# Patient Record
Sex: MALE | Race: WHITE | NOT HISPANIC OR LATINO | URBAN - METROPOLITAN AREA
[De-identification: names, ages, dates, MRNs, and addresses within clinical notes are randomized per-mention and may not be internally consistent; named-entity substitution may affect disease eponyms.]

---

## 2017-04-28 ENCOUNTER — EMERGENCY (EMERGENCY)
Facility: HOSPITAL | Age: 77
LOS: 0 days | Discharge: HOME | End: 2017-04-28
Attending: EMERGENCY MEDICINE | Admitting: EMERGENCY MEDICINE
Payer: MEDICARE

## 2017-04-28 VITALS
DIASTOLIC BLOOD PRESSURE: 67 MMHG | HEART RATE: 68 BPM | SYSTOLIC BLOOD PRESSURE: 123 MMHG | HEIGHT: 70 IN | TEMPERATURE: 98 F | OXYGEN SATURATION: 97 % | WEIGHT: 195.11 LBS | RESPIRATION RATE: 17 BRPM

## 2017-04-28 VITALS
DIASTOLIC BLOOD PRESSURE: 77 MMHG | HEART RATE: 74 BPM | RESPIRATION RATE: 16 BRPM | OXYGEN SATURATION: 100 % | TEMPERATURE: 98 F | SYSTOLIC BLOOD PRESSURE: 145 MMHG

## 2017-04-28 DIAGNOSIS — S09.90XA UNSPECIFIED INJURY OF HEAD, INITIAL ENCOUNTER: ICD-10-CM
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
## 2017-04-28 DIAGNOSIS — S40.011A CONTUSION OF RIGHT SHOULDER, INITIAL ENCOUNTER: ICD-10-CM

## 2017-04-28 DIAGNOSIS — Z86.73 PERSONAL HISTORY OF TRANSIENT ISCHEMIC ATTACK (TIA), AND CEREBRAL INFARCTION WITHOUT RESIDUAL DEFICITS: ICD-10-CM

## 2017-04-28 DIAGNOSIS — S49.81XA OTHER SPECIFIED INJURIES OF RIGHT SHOULDER AND UPPER ARM, INITIAL ENCOUNTER: ICD-10-CM

## 2017-04-28 DIAGNOSIS — Z79.82 LONG TERM (CURRENT) USE OF ASPIRIN: ICD-10-CM

## 2017-04-28 LAB
ABO RH CONFIRMATION: SIGNIFICANT CHANGE UP
ALBUMIN SERPL ELPH-MCNC: 4 G/DL — SIGNIFICANT CHANGE UP (ref 3.3–5)
ALP SERPL-CCNC: 57 U/L — SIGNIFICANT CHANGE UP (ref 40–120)
ALT FLD-CCNC: 24 U/L — SIGNIFICANT CHANGE UP (ref 12–78)
ANION GAP SERPL CALC-SCNC: 9 MMOL/L — SIGNIFICANT CHANGE UP (ref 5–17)
APTT BLD: 28.3 SEC — SIGNIFICANT CHANGE UP (ref 27.5–37.4)
AST SERPL-CCNC: 17 U/L — SIGNIFICANT CHANGE UP (ref 15–37)
BASOPHILS # BLD AUTO: 0.1 K/UL — SIGNIFICANT CHANGE UP (ref 0–0.2)
BASOPHILS NFR BLD AUTO: 0.9 % — SIGNIFICANT CHANGE UP (ref 0–2)
BILIRUB SERPL-MCNC: 0.5 MG/DL — SIGNIFICANT CHANGE UP (ref 0.2–1.2)
BLD GP AB SCN SERPL QL: SIGNIFICANT CHANGE UP
BUN SERPL-MCNC: 17 MG/DL — SIGNIFICANT CHANGE UP (ref 7–23)
CALCIUM SERPL-MCNC: 9.2 MG/DL — SIGNIFICANT CHANGE UP (ref 8.5–10.1)
CHLORIDE SERPL-SCNC: 108 MMOL/L — SIGNIFICANT CHANGE UP (ref 96–108)
CO2 SERPL-SCNC: 27 MMOL/L — SIGNIFICANT CHANGE UP (ref 22–31)
CREAT SERPL-MCNC: 1.12 MG/DL — SIGNIFICANT CHANGE UP (ref 0.5–1.3)
EOSINOPHIL # BLD AUTO: 0.1 K/UL — SIGNIFICANT CHANGE UP (ref 0–0.5)
EOSINOPHIL NFR BLD AUTO: 1 % — SIGNIFICANT CHANGE UP (ref 0–6)
GLUCOSE SERPL-MCNC: 117 MG/DL — HIGH (ref 70–99)
HCT VFR BLD CALC: 42.4 % — SIGNIFICANT CHANGE UP (ref 39–50)
HGB BLD-MCNC: 13.5 G/DL — SIGNIFICANT CHANGE UP (ref 13–17)
INR BLD: 1 RATIO — SIGNIFICANT CHANGE UP (ref 0.88–1.16)
LYMPHOCYTES # BLD AUTO: 1.2 K/UL — SIGNIFICANT CHANGE UP (ref 1–3.3)
LYMPHOCYTES # BLD AUTO: 13.1 % — SIGNIFICANT CHANGE UP (ref 13–44)
MCHC RBC-ENTMCNC: 26.9 PG — LOW (ref 27–34)
MCHC RBC-ENTMCNC: 31.9 GM/DL — LOW (ref 32–36)
MCV RBC AUTO: 84.5 FL — SIGNIFICANT CHANGE UP (ref 80–100)
MONOCYTES # BLD AUTO: 0.5 K/UL — SIGNIFICANT CHANGE UP (ref 0–0.9)
MONOCYTES NFR BLD AUTO: 5.6 % — SIGNIFICANT CHANGE UP (ref 2–14)
NEUTROPHILS # BLD AUTO: 7.1 K/UL — SIGNIFICANT CHANGE UP (ref 1.8–7.4)
NEUTROPHILS NFR BLD AUTO: 79.4 % — HIGH (ref 43–77)
PLATELET # BLD AUTO: 100 K/UL — LOW (ref 150–400)
POTASSIUM SERPL-MCNC: 4.2 MMOL/L — SIGNIFICANT CHANGE UP (ref 3.5–5.3)
POTASSIUM SERPL-SCNC: 4.2 MMOL/L — SIGNIFICANT CHANGE UP (ref 3.5–5.3)
PROT SERPL-MCNC: 7.1 GM/DL — SIGNIFICANT CHANGE UP (ref 6–8.3)
PROTHROM AB SERPL-ACNC: 10.8 SEC — SIGNIFICANT CHANGE UP (ref 9.8–12.7)
RBC # BLD: 5.02 M/UL — SIGNIFICANT CHANGE UP (ref 4.2–5.8)
RBC # FLD: 14 % — SIGNIFICANT CHANGE UP (ref 10.3–14.5)
SODIUM SERPL-SCNC: 144 MMOL/L — SIGNIFICANT CHANGE UP (ref 135–145)
TYPE + AB SCN PNL BLD: SIGNIFICANT CHANGE UP
WBC # BLD: 8.7 K/UL — SIGNIFICANT CHANGE UP (ref 3.8–10.5)
WBC # FLD AUTO: 8.7 K/UL — SIGNIFICANT CHANGE UP (ref 3.8–10.5)

## 2017-04-28 PROCEDURE — 93010 ELECTROCARDIOGRAM REPORT: CPT

## 2017-04-28 PROCEDURE — 99285 EMERGENCY DEPT VISIT HI MDM: CPT

## 2017-04-28 PROCEDURE — 71250 CT THORAX DX C-: CPT | Mod: 26

## 2017-04-28 PROCEDURE — 73030 X-RAY EXAM OF SHOULDER: CPT | Mod: 26,RT

## 2017-04-28 PROCEDURE — 74176 CT ABD & PELVIS W/O CONTRAST: CPT | Mod: 26

## 2017-04-28 PROCEDURE — 70450 CT HEAD/BRAIN W/O DYE: CPT | Mod: 26

## 2017-04-28 PROCEDURE — 72125 CT NECK SPINE W/O DYE: CPT | Mod: 26

## 2017-04-28 RX ORDER — FINASTERIDE 5 MG/1
0 TABLET, FILM COATED ORAL
Qty: 0 | Refills: 0 | COMMUNITY

## 2017-04-28 RX ORDER — ASPIRIN/CALCIUM CARB/MAGNESIUM 324 MG
1 TABLET ORAL
Qty: 0 | Refills: 0 | COMMUNITY

## 2017-04-28 RX ORDER — SIMVASTATIN 20 MG/1
0 TABLET, FILM COATED ORAL
Qty: 0 | Refills: 0 | COMMUNITY
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
## 2017-04-28 NOTE — ED ADULT NURSE NOTE - CHPI ED SYMPTOMS NEG
no bleeding/no numbness/no weakness/no vomiting/no loss of consciousness/no deformity/no tingling/no confusion/no fever

## 2017-04-28 NOTE — ED PROVIDER NOTE - NS ED MD SCRIBE ATTENDING SCRIBE SECTIONS
DISPOSITION/PROGRESS NOTE/PAST MEDICAL/SURGICAL/SOCIAL HISTORY/REVIEW OF SYSTEMS/HISTORY OF PRESENT ILLNESS/RESULTS/PHYSICAL EXAM

## 2017-04-28 NOTE — ED PROVIDER NOTE - DETAILS:
I, Poppy Avery, performed the initial face to face bedside interview with this patient regarding history of present illness, review of symptoms and relevant past medical, social and family history.  I completed an independent physical examination.  I was the initial provider who evaluated this patient. The history, relevant review of systems, past medical and surgical history, medical decision making, and physical examination was documented by the scribe in my presence and I attest to the accuracy of the documentation.

## 2017-04-28 NOTE — ED PROVIDER NOTE - MUSCULOSKELETAL, MLM
Spine appears normal, range of motion is not limited, no muscle or joint tenderness Spine appears normal, no midline CTLS spine tenderness, range of motion is not limited, no muscle or joint tenderness
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
## 2019-02-15 ENCOUNTER — EMERGENCY (EMERGENCY)
Facility: HOSPITAL | Age: 79
LOS: 0 days | Discharge: HOME | End: 2019-02-16
Attending: FAMILY MEDICINE | Admitting: FAMILY MEDICINE
Payer: MEDICARE

## 2019-02-15 VITALS — WEIGHT: 175.05 LBS | HEIGHT: 70 IN

## 2019-02-15 VITALS
OXYGEN SATURATION: 99 % | TEMPERATURE: 98 F | DIASTOLIC BLOOD PRESSURE: 65 MMHG | SYSTOLIC BLOOD PRESSURE: 145 MMHG | HEART RATE: 74 BPM | RESPIRATION RATE: 17 BRPM

## 2019-02-15 DIAGNOSIS — K59.00 CONSTIPATION, UNSPECIFIED: ICD-10-CM

## 2019-02-15 PROCEDURE — 99283 EMERGENCY DEPT VISIT LOW MDM: CPT

## 2019-02-15 PROCEDURE — 74019 RADEX ABDOMEN 2 VIEWS: CPT | Mod: 26

## 2019-02-15 NOTE — ED ADULT TRIAGE NOTE - HEIGHT IN CM
Your  Procedure  is scheduled for ____2/24/2017________________.    Call [phone] between 2 p.m. and 5 p.m. _________________ to find out your arrival time for the day of your procedure.      Please report to SAME DAY SURGERY UNIT on the 2nd FLOOR at ___9:00____ a.m.    INSTRUCTIONS IMPORTANT!!!  ¨ Do not eat or drink after 12 midnight-including water. OK to brush teeth, no   gum, candy or mints!    ¨ Take only these medicines with a small swallow of water-morning of your procedure.               You may take your pain medication if needed.        __x__  Prep instructions:   SHOWER   OTHER  ______________________  __x__  Wash the procedural  area with Hibiclens the night before  and again the             morning of your procedure.  Be sure to rinse hibiclens off completely .    _x___  No powder, lotions or creams   _x___  You may wear only deodorant on the day of  procedure.  _x___  Please remove all jewelry, including piercings and leave at home.  _x___  No money or valuables needed. Please leave at home.  _x___  If going home the same day, arrange for a ride home. You will not be able to   drive if sedation  was used.  _x___  Wear loose fitting clothing. Allow for dressings, bandages.  _x___  NO  Aspirin, Ibuprofen, Motrin and Aleve at least 3-5 days before                       surgery, unless otherwise instructed by the radiologist or the nurse.              You MAY use Tylenol/acetaminophen until day of the procedure.    _x___  Call MD for temperature above 101 degrees.            I have read or had read and explained to me, and understand the above information.  Additional comments or instructions:Please call  [phone] if you have any questions regarding the instructions above.       177.8

## 2019-02-15 NOTE — ED ADULT TRIAGE NOTE - CHIEF COMPLAINT QUOTE
Pt presents c/o constipation for one day. Pt states he took magnesium sulfate at 7pm and ducolax at 6 pm with no relief. Pt states he has no issues urinating.

## 2019-02-15 NOTE — ED STATDOCS - OBJECTIVE STATEMENT
Pt is a 77 yo wm with hx inc chol, ?prostate issues, who c/o constipation since today. Last bm 2 days ago states like marbles. No fever or abdominal pain. Feels as if he has to go. no problem urinating. Pt tried dulcolax oral and mag citrate without relief.  Normal appetitie but has been on new diet.  Last colonoscopy 2 years ago without issues. Pmd Maria Fernanda. has appt Monday. No new meds. Nonsmoker nondrinker no drugs.

## 2019-02-15 NOTE — ED STATDOCS - PROGRESS NOTE DETAILS
Pt had bm after enema with relief of rectal pressure. Abdominal xray does not show obstruction or excessive fecal load. Pt to take miralax as outpatient and follow up with GI. Juhi JAMIL Pt had bm after enema with relief of rectal pressure. Abdominal xray does not show obstruction or excessive fecal load in the rectum. Pt to take miralax as outpatient and follow up with GI. Juhi JAMIL

## 2019-02-16 PROBLEM — G45.9 TRANSIENT CEREBRAL ISCHEMIC ATTACK, UNSPECIFIED: Chronic | Status: ACTIVE | Noted: 2017-04-28

## 2019-09-22 ENCOUNTER — EMERGENCY (EMERGENCY)
Facility: HOSPITAL | Age: 79
LOS: 0 days | Discharge: HOME | End: 2019-09-22
Attending: EMERGENCY MEDICINE
Payer: MEDICARE

## 2019-09-22 VITALS
WEIGHT: 175.05 LBS | RESPIRATION RATE: 16 BRPM | SYSTOLIC BLOOD PRESSURE: 130 MMHG | TEMPERATURE: 98 F | HEIGHT: 70 IN | HEART RATE: 89 BPM | OXYGEN SATURATION: 97 % | DIASTOLIC BLOOD PRESSURE: 76 MMHG

## 2019-09-22 DIAGNOSIS — R31.9 HEMATURIA, UNSPECIFIED: ICD-10-CM

## 2019-09-22 DIAGNOSIS — N30.01 ACUTE CYSTITIS WITH HEMATURIA: ICD-10-CM

## 2019-09-22 DIAGNOSIS — Z79.82 LONG TERM (CURRENT) USE OF ASPIRIN: ICD-10-CM

## 2019-09-22 DIAGNOSIS — N40.0 BENIGN PROSTATIC HYPERPLASIA WITHOUT LOWER URINARY TRACT SYMPTOMS: ICD-10-CM

## 2019-09-22 LAB
APPEARANCE UR: ABNORMAL
BILIRUB UR-MCNC: NEGATIVE — SIGNIFICANT CHANGE UP
COLOR SPEC: YELLOW — SIGNIFICANT CHANGE UP
DIFF PNL FLD: ABNORMAL
GLUCOSE UR QL: NEGATIVE MG/DL — SIGNIFICANT CHANGE UP
KETONES UR-MCNC: NEGATIVE — SIGNIFICANT CHANGE UP
LEUKOCYTE ESTERASE UR-ACNC: ABNORMAL
NITRITE UR-MCNC: POSITIVE
PH UR: 5 — SIGNIFICANT CHANGE UP (ref 5–8)
PROT UR-MCNC: 100 MG/DL
SP GR SPEC: 1.01 — SIGNIFICANT CHANGE UP (ref 1.01–1.02)
UROBILINOGEN FLD QL: NEGATIVE MG/DL — SIGNIFICANT CHANGE UP

## 2019-09-22 PROCEDURE — 99283 EMERGENCY DEPT VISIT LOW MDM: CPT

## 2019-09-22 PROCEDURE — 99284 EMERGENCY DEPT VISIT MOD MDM: CPT

## 2019-09-22 PROCEDURE — 87086 URINE CULTURE/COLONY COUNT: CPT

## 2019-09-22 PROCEDURE — 87186 SC STD MICRODIL/AGAR DIL: CPT

## 2019-09-22 PROCEDURE — 81001 URINALYSIS AUTO W/SCOPE: CPT

## 2019-09-22 RX ORDER — CEPHALEXIN 500 MG
500 CAPSULE ORAL ONCE
Refills: 0 | Status: DISCONTINUED | OUTPATIENT
Start: 2019-09-22 | End: 2019-09-22

## 2019-09-22 RX ORDER — CEPHALEXIN 500 MG
1 CAPSULE ORAL
Qty: 14 | Refills: 0
Start: 2019-09-22 | End: 2019-09-28

## 2019-09-22 NOTE — ED STATDOCS - CARE PROVIDER_API CALL
Cooper Camilo)  Urology  [address] [place] [address], [address] [place] [address]  Phone: [phone]  Fax: [phone]  Follow Up Time:     Mainor Price)  Urology  [address], [facility] [place] [address]  Phone: [phone]  Fax: [phone]  Follow Up Time:     Julio Cesar Gutierrez)  Urology  [address], [facility] [place] [address]  Phone: [phone]  Fax: [phone]  Follow Up Time:

## 2019-09-22 NOTE — ED STATDOCS - PATIENT PORTAL LINK FT
You can access the FollowMyHealth Patient Portal offered by [facility] by registering at the following website: [email or web address]. By joining [employer]’s FollowMyHealth portal, you will also be able to view your health information using other applications (apps) compatible with our system.

## 2019-09-22 NOTE — ED STATDOCS - ATTENDING CONTRIBUTION TO CARE
Attending Contribution to Care: I, Tigist Cisneros, performed the initial face to face bedside interview with this patient regarding history of present illness, review of symptoms and relevant past medical, social and family history.  I completed an independent physical examination.  I was the initial provider who evaluated this patient. I have signed out the follow up of any pending tests (i.e. labs, radiological studies) to the ACP.  I have communicated the patient’s plan of care and disposition with the ACP.

## 2019-09-22 NOTE — ED STATDOCS - OBJECTIVE STATEMENT
77 y/o male with PMHx of TIA and enlarged prostate presents to the ED c/o +hematuria beginning today. Also notes +urinary frequency. Denies fever or chills. No flank pain. Not on blood thinners. NKDA. Urologist: Dr. Ronnie Coughlin.

## 2019-09-22 NOTE — ED STATDOCS - CLINICAL SUMMARY MEDICAL DECISION MAKING FREE TEXT BOX
First time episode of blood tinged urine. No problems urinating, no vomiting or nausea, no abd pain, no flank pain. UA and culture to be sent and pt referred to his urologist. First time episode of blood tinged urine. No problems urinating, no vomiting or nausea, no abd pain, no flank pain. UA and culture to be sent and pt referred to his urologist.  UA with nitrate positive UTI, will start keflex, d/c home with outpt f/u with urologist

## 2019-09-22 NOTE — ED STATDOCS - PROVIDER TOKENS
PROVIDER:[TOKEN:[[id number]]],PROVIDER:[TOKEN:[[id number]]],PROVIDER:[TOKEN:[[id number]]]

## 2019-09-22 NOTE — ED ADULT TRIAGE NOTE - CHIEF COMPLAINT QUOTE
patient c/o blood in urine and starting approx 30 mins ago, frequent urination starting today. denies fever. hx of enlarged prostate, on medication.

## 2019-09-22 NOTE — ED STATDOCS - PROGRESS NOTE DETAILS
79 y/o male with PMHx of TIA and enlarged prostate presents to the ED c/o +hematuria beginning today. Also notes +urinary frequency, denies dysuria. Denies fever or chills. No flank pain. Not on blood thinners. NKDA. Urologist: Dr. Ronnie Coughlin.  PE: no CVA tenderness, abd soft non-tender, non-distended, lungs CAT b/l, heart RRR s1/s2  Plan: UA, urine culture  Monserrat Ma PA-C UA with nitrate positive UTI, will start keflex, send urine culture, d/c home with outpt f/u with urologist, pt agreeable to d/c and plan of care, all questions answered, return precautions given  Monserrat Ma PA-C Pt does not want to wait for first dose of abx here, will  from 24 hour pharmacy now   Monserrat Ma PA-C

## 2019-09-24 NOTE — ED POST DISCHARGE NOTE - RESULT SUMMARY
Prelim Urine Cx with > 100,000 Gram Neg Rods.  Pt treated with Keflex.  F/U Sensitivities  ROXANNA batres PA-C

## 2020-01-20 ENCOUNTER — EMERGENCY (EMERGENCY)
Facility: HOSPITAL | Age: 80
LOS: 0 days | Discharge: HOME | End: 2020-01-20
Attending: EMERGENCY MEDICINE
Payer: MEDICARE

## 2020-01-20 VITALS
HEART RATE: 61 BPM | DIASTOLIC BLOOD PRESSURE: 52 MMHG | TEMPERATURE: 98 F | OXYGEN SATURATION: 100 % | RESPIRATION RATE: 18 BRPM | SYSTOLIC BLOOD PRESSURE: 127 MMHG

## 2020-01-20 VITALS — WEIGHT: 175.93 LBS | HEIGHT: 70 IN

## 2020-01-20 DIAGNOSIS — E78.5 HYPERLIPIDEMIA, UNSPECIFIED: ICD-10-CM

## 2020-01-20 DIAGNOSIS — R05 COUGH: ICD-10-CM

## 2020-01-20 DIAGNOSIS — Z86.73 PERSONAL HISTORY OF TRANSIENT ISCHEMIC ATTACK (TIA), AND CEREBRAL INFARCTION WITHOUT RESIDUAL DEFICITS: ICD-10-CM

## 2020-01-20 DIAGNOSIS — Z92.241 PERSONAL HISTORY OF SYSTEMIC STEROID THERAPY: ICD-10-CM

## 2020-01-20 DIAGNOSIS — M41.9 SCOLIOSIS, UNSPECIFIED: ICD-10-CM

## 2020-01-20 DIAGNOSIS — Z79.82 LONG TERM (CURRENT) USE OF ASPIRIN: ICD-10-CM

## 2020-01-20 DIAGNOSIS — N40.0 BENIGN PROSTATIC HYPERPLASIA WITHOUT LOWER URINARY TRACT SYMPTOMS: ICD-10-CM

## 2020-01-20 DIAGNOSIS — I10 ESSENTIAL (PRIMARY) HYPERTENSION: ICD-10-CM

## 2020-01-20 PROCEDURE — 94640 AIRWAY INHALATION TREATMENT: CPT

## 2020-01-20 PROCEDURE — 71046 X-RAY EXAM CHEST 2 VIEWS: CPT | Mod: 26

## 2020-01-20 PROCEDURE — 71046 X-RAY EXAM CHEST 2 VIEWS: CPT

## 2020-01-20 PROCEDURE — 99283 EMERGENCY DEPT VISIT LOW MDM: CPT

## 2020-01-20 PROCEDURE — 99283 EMERGENCY DEPT VISIT LOW MDM: CPT | Mod: 25

## 2020-01-20 RX ORDER — IPRATROPIUM/ALBUTEROL SULFATE 18-103MCG
3 AEROSOL WITH ADAPTER (GRAM) INHALATION ONCE
Refills: 0 | Status: COMPLETED | OUTPATIENT
Start: 2020-01-20 | End: 2020-01-20

## 2020-01-20 RX ORDER — ALBUTEROL 90 UG/1
2 AEROSOL, METERED ORAL
Qty: 1 | Refills: 0
Start: 2020-01-20 | End: 2020-01-26

## 2020-01-20 RX ORDER — AMOXICILLIN 250 MG/5ML
1 SUSPENSION, RECONSTITUTED, ORAL (ML) ORAL
Qty: 14 | Refills: 0
Start: 2020-01-20 | End: 2020-01-26

## 2020-01-20 RX ADMIN — Medication 3 MILLILITER(S): at 11:42

## 2020-01-20 NOTE — ED ADULT TRIAGE NOTE - CHIEF COMPLAINT QUOTE
c/o cough x 2 weeks, worse at night, denies fever, possible sick contact, denies lower extremities swelling, went to urgent care 1 week ago, have been taking steroids with no relief in symptoms, took hydrocodone which helped at night, O2 sat in 98% on RA, pulse 70

## 2020-01-20 NOTE — ED STATDOCS - PHYSICAL EXAMINATION
*GEN: No acute distress, well appearing; A+O x3   *HEAD: Normocephalic, Atraumatic  *EYES/NOSE: PERRL & EOMI b/l  *THROAT: airway patent, moist mucous membranes  *NECK: Neck supple, no masses  *PULMONARY: CTA b/l, symmetric breath sounds.   *CARDIAC: s1s2, regular rhythm, no Murmur  *ABDOMEN:  Non Tender, Non Distended, soft, no guarding, no rebound, no masses   *BACK: no CVA tenderness, Normal  spine   *EXTREMITIES: symmetric pulses, 2+ dp & radial pulses, capillary refill < 2 seconds, no cyanosis, no edema   *SKIN: no rash or bruising   *NEUROLOGIC: alert, CN 2-12 intact, moves all 4 extremities, full active & passive ROM in all extremities, normal baseline gait  *PSYCH: insight and judgment nl, memory nl, affect nl, thought nl

## 2020-01-20 NOTE — ED ADULT NURSE NOTE - NSIMPLEMENTINTERV_GEN_ALL_ED
Implemented All Universal Safety Interventions:  [place] to call system. Call bell, personal items and telephone within reach. Instruct patient to call for assistance. Room bathroom lighting operational. Non-slip footwear when patient is off stretcher. Physically safe environment: no spills, clutter or unnecessary equipment. Stretcher in lowest position, wheels locked, appropriate side rails in place.

## 2020-01-20 NOTE — ED STATDOCS - PROGRESS NOTE DETAILS
80 y/o Male presents to ED c/o cough for 3 weeks.  Seen at  and given liquid steroid first s relief.  Pt returned and was given Hydrocodone syrup that relieves cough at night, but pt reports it is still persisting.  Neg fevers, SOB, chest or back pains.  Reports cough seems to get worse at nighttime.  Denies post nasal drip.  On exam, well appearing elderly male.  CTA B.  RRR.  Will F/U CXR and re-eval s/p Neb.  Sintia Paniagua PA-C On re-eval, CTA B.  Neg wheezes, rhonchi, crackles s/p Neb.  CXr per Radiology without infiltrates.  Will dc home.  Cont. Albuterol.  F/U with PMD.  Sintia Paniagua PA-C

## 2020-01-20 NOTE — ED ADULT NURSE NOTE - OBJECTIVE STATEMENT
pt presents to ED with dry cough x few weeks. pt states his cough is productive at times. pt states he was seen in Urgent care a few weeks ago and given steroids. pt was compliant with meds with no relief, afebrile. breathing is even and unlabored. a&ox4. will continue to monitor and reassess

## 2020-01-20 NOTE — ED STATDOCS - ATTENDING CONTRIBUTION TO CARE
I, Lan Powers MD,  performed the initial face to face bedside interview with this patient regarding history of present illness, review of symptoms and relevant past medical, social and family history.  I completed an independent physical examination.  I was the initial provider who evaluated this patient. I have signed out the follow up of any pending tests (i.e. labs, radiological studies) to the ACP.  I have communicated the patient’s plan of care and disposition with the ACP.

## 2020-01-20 NOTE — ED STATDOCS - NS ED ROS FT
Review of Systems:  	•	CONSTITUTIONAL: no fever  	•	SKIN: no rash  	•	RESPIRATORY: no shortness of breath. +cough   	•	CARDIAC: no chest pain, no palpitations  	•	GI:  no abd pain, no nausea, no vomiting, no diarrhea  	•	GENITO-URINARY:  no dysuria; no hematuria    	•	MUSCULOSKELETAL:  no back pain  	•	NEUROLOGIC: no weakness  	•	ALLERGY: no rhinitis  	•	PSYSCHIATRIC: no anxiety

## 2020-01-20 NOTE — ED STATDOCS - PATIENT PORTAL LINK FT
You can access the FollowMyHealth Patient Portal offered by [facility] by registering at the following website: [email or web address]. By joining [employer]’s FollowMyHealth portal, you will also be able to view your health information using other applications (apps) compatible with our system.

## 2020-01-20 NOTE — ED STATDOCS - OBJECTIVE STATEMENT
Pertinent  HPI/PMH/PSH/FHx/SHx and Review of Systems contained within  HPI:  79yoM  p/w CC cough x2-3 weeks, worse at night. Pt was seen at  1 week ago and was given steroids with which pt has been compliant. Notes he was not given abx. Pt states he has had no relief from steroids though he does admit to taking Hydrocodone with mild relief. States cough is "sometimes" productive. +former smoker; 50 years ago.   PMH/PSH relevant for: TIA, enlarged prostate, HLD  ROS negative for: fever, Chest pain, SOB, Nausea, vomiting, diarrhea, abdominal pain, dysuria, runny nose.  FamilyHx and SocialHx not otherwise contributory

## 2020-01-20 NOTE — ED STATDOCS - CLINICAL SUMMARY MEDICAL DECISION MAKING FREE TEXT BOX
Likely viral vs allergic bronchitis. Pt requesting abx, will get CXR, eval for PNA and determine if npt needs abx. Pt well appearing.

## 2020-01-20 NOTE — ED STATDOCS - NSFOLLOWUPINSTRUCTIONS_ED_ALL_ED_FT
Cough, Adult     A cough helps to clear your throat and lungs. A cough may last only 2–3 weeks (acute), or it may last longer than 8 weeks (chronic). Many different things can cause a cough. A cough may be a sign of an illness or another medical condition.  Follow these instructions at home:  Pay attention to any changes in your cough.Take medicines only as told by your doctor.  If you were prescribed an antibiotic medicine, take it as told by your doctor. Do not stop taking it even if you start to feel better.Talk with your doctor before you try using a cough medicine.Drink enough fluid to keep your pee (urine) clear or pale yellow.If the air is dry, use a cold steam vaporizer or humidifier in your home.Stay away from things that make you cough at work or at home.If your cough is worse at night, try using extra pillows to raise your head up higher while you sleep.Do not smoke, and try not to be around smoke. If you need help quitting, ask your doctor.Do not have caffeine.Do not drink alcohol.Rest as needed.Contact a doctor if:  You have new problems (symptoms).You cough up yellow fluid (pus).Your cough does not get better after 2–3 weeks, or your cough gets worse.Medicine does not help your cough and you are not sleeping well.You have pain that gets worse or pain that is not helped with medicine.You have a fever.You are losing weight and you do not know why.You have night sweats.Get help right away if:  You cough up blood.You have trouble breathing.Your heartbeat is very fast.This information is not intended to replace advice given to you by your health care provider. Make sure you discuss any questions you have with your health care provider.    Document Released: 08/30/2012 Document Revised: 05/25/2017 Document Reviewed: 02/24/2016  [employer] Interactive Patient Education © 2019 [employer]

## 2021-08-02 PROBLEM — E78.5 HYPERLIPIDEMIA, UNSPECIFIED: Chronic | Status: ACTIVE | Noted: 2020-01-27

## 2021-08-02 PROBLEM — I10 ESSENTIAL (PRIMARY) HYPERTENSION: Chronic | Status: ACTIVE | Noted: 2020-01-27

## 2021-08-11 PROBLEM — Z00.00 ENCOUNTER FOR PREVENTIVE HEALTH EXAMINATION: Status: ACTIVE | Noted: 2021-08-11

## 2021-08-18 ENCOUNTER — APPOINTMENT (OUTPATIENT)
Dept: OTOLARYNGOLOGY | Facility: CLINIC | Age: 81
End: 2021-08-18

## 2022-04-21 NOTE — ED ADULT NURSE NOTE - OBJECTIVE STATEMENT
April 21, 2022      [address] [address]  Phone: [phone]  Fax: [phone]       Patient: Martín Moe   YOB: 1984  Date of Visit: 04/21/2022    To Whom It May Concern:    Iraj Moe  was at [facility] on 04/21/2022. The patient may return to work/school on 05/05/2022 with no restrictions. If you have any questions or concerns, or if I can be of further assistance, please do not hesitate to contact me.    Sincerely,        Orlin Suarez RN      PT to ed for constipation and abdominal pain. Pt states he took ducolax and mag citrate at home without any relied. States last BM 2 days ago. Denies nausea and vomiting. Bowel sounds present in all four quadrants. Abdomen soft non distended. VSS. Will monitor.

## 2022-12-23 ENCOUNTER — APPOINTMENT (OUTPATIENT)
Dept: ORTHOPEDIC SURGERY | Facility: CLINIC | Age: 82
End: 2022-12-23

## 2022-12-23 ENCOUNTER — NON-APPOINTMENT (OUTPATIENT)
Age: 82
End: 2022-12-23

## 2022-12-23 VITALS
WEIGHT: 170 LBS | SYSTOLIC BLOOD PRESSURE: 116 MMHG | HEART RATE: 46 BPM | HEIGHT: 70 IN | DIASTOLIC BLOOD PRESSURE: 74 MMHG | BODY MASS INDEX: 24.34 KG/M2

## 2022-12-23 DIAGNOSIS — M70.62 TROCHANTERIC BURSITIS, LEFT HIP: ICD-10-CM

## 2022-12-23 PROCEDURE — 73502 X-RAY EXAM HIP UNI 2-3 VIEWS: CPT | Mod: LT

## 2022-12-23 PROCEDURE — 20610 DRAIN/INJ JOINT/BURSA W/O US: CPT | Mod: LT

## 2022-12-23 PROCEDURE — 99204 OFFICE O/P NEW MOD 45 MIN: CPT | Mod: 25

## 2022-12-23 NOTE — PHYSICAL EXAM
[de-identified] : General Appearance: normal without acute distress\par Mental: Alert and oriented x 3\par Psych/affect: appropriate, cooperative\par Gait: Normal gait\par \par Left hip\par Nontender to palpation\par No pain with hip internal rotation, full range of motion\par Negative Kareen's test [de-identified] : AP Pelvis and 2 views of the left hip were reviewed and demonstrate mildly decreased joint space

## 2022-12-23 NOTE — HISTORY OF PRESENT ILLNESS
[de-identified] : Patient presents with 3 weeks of left hip pain.  States he fell and hit his knee and he had some pain in his knee as well as his hip.  He was played pickle ball next day and had an increase in the pain.  He took approximately a month off and when he returned the pain returned.  Scribes pain is over his lateral hip over his greater troch.  The pain is sharp in nature.  Took some Aleve but that did not help.  Does have a history of back issues and had steroid injection in his low and upper back.  Denies radiating pain or numbness and tingling.

## 2022-12-23 NOTE — PROCEDURE
[de-identified] : Left greater troch bursa injection - Steroid\par The risks, benefits, and alternatives of the injection were reviewed/discussed with the patient today in office and all of their questions were answered. The patient consented to the procedure. The point of maximal pain was prepped with chloroprep.  Cold spray was utilized to numb the skin. Utilizing sterile technique, the greater troch bursa was injected with 1 ml 40 mg [methylprednisolone], 6 ml 1% Lidocaine. A sterile bandage was applied. The patient tolerated the procedure well and there were no complications.

## 2022-12-23 NOTE — DISCUSSION/SUMMARY
[de-identified] : Left greater trochanteric bursitis\par \par Extensive discussion of the natural history of this disease was had with the patient.  We discussed the treatment options focusing on conservative therapy which includes anti-inflammatories, physical therapy/home exercise, activity modification.  Patient elected for steroid injection today.  Patient also would like anti-inflammatories, prescription for meloxicam was sent to his pharmacy.  Handout on stretching and exercises also given to the patient.  If the pain continues for 1 to 2 months patient should follow-up for reevaluation.

## 2023-04-18 ENCOUNTER — OFFICE (OUTPATIENT)
Dept: URBAN - METROPOLITAN AREA CLINIC 12 | Facility: CLINIC | Age: 83
Setting detail: OPHTHALMOLOGY
End: 2023-04-18
Payer: MEDICARE

## 2023-04-18 DIAGNOSIS — H40.013: ICD-10-CM

## 2023-04-18 PROBLEM — Z96.1 PSEUDOPHAKIA ; BOTH EYES: Status: ACTIVE | Noted: 2023-04-18

## 2023-04-18 PROCEDURE — 92004 COMPRE OPH EXAM NEW PT 1/>: CPT | Performed by: STUDENT IN AN ORGANIZED HEALTH CARE EDUCATION/TRAINING PROGRAM

## 2023-04-18 ASSESSMENT — REFRACTION_MANIFEST
OS_SPHERE: +1.00
OD_AXIS: 005
OD_SPHERE: +0.75
OD_ADD: +2.50
OD_CYLINDER: -0.50
OS_ADD: +2.50

## 2023-04-18 ASSESSMENT — REFRACTION_CURRENTRX
OS_SPHERE: +1.50
OS_ADD: +2.25
OS_VPRISM_DIRECTION: PROGS
OS_CYLINDER: -0.75
OD_OVR_VA: 20/
OD_ADD: +2.25
OS_AXIS: 109
OD_VPRISM_DIRECTION: PROGS
OD_CYLINDER: -0.50
OD_SPHERE: +1.50
OD_AXIS: 062
OS_OVR_VA: 20/

## 2023-04-18 ASSESSMENT — REFRACTION_AUTOREFRACTION
OS_CYLINDER: -0.75
OD_SPHERE: +1.50
OS_SPHERE: +1.75
OD_AXIS: 054
OD_CYLINDER: -0.25
OS_AXIS: 102

## 2023-04-18 ASSESSMENT — SPHEQUIV_DERIVED
OD_SPHEQUIV: 0.5
OD_SPHEQUIV: 1.375
OS_SPHEQUIV: 1.375

## 2023-04-18 ASSESSMENT — KERATOMETRY
OD_K1POWER_DIOPTERS: 39.75
OS_AXISANGLE_DEGREES: 051
OD_AXISANGLE_DEGREES: 100
OS_K2POWER_DIOPTERS: 40.50
OS_K1POWER_DIOPTERS: 40.00
OD_K2POWER_DIOPTERS: 40.25

## 2023-04-18 ASSESSMENT — AXIALLENGTH_DERIVED
OD_AL: 24.7356
OS_AL: 24.2688
OD_AL: 24.3664

## 2023-04-18 ASSESSMENT — CONFRONTATIONAL VISUAL FIELD TEST (CVF)
OD_FINDINGS: FULL
OS_FINDINGS: FULL

## 2023-04-18 ASSESSMENT — VISUAL ACUITY
OS_BCVA: 20/20-1
OD_BCVA: 20/20-1

## 2023-04-18 ASSESSMENT — TONOMETRY
OS_IOP_MMHG: 19
OD_IOP_MMHG: 18

## 2023-05-30 ENCOUNTER — APPOINTMENT (OUTPATIENT)
Dept: OTOLARYNGOLOGY | Facility: CLINIC | Age: 83
End: 2023-05-30
Payer: MEDICARE

## 2023-05-30 ENCOUNTER — NON-APPOINTMENT (OUTPATIENT)
Age: 83
End: 2023-05-30

## 2023-05-30 VITALS
BODY MASS INDEX: 24.62 KG/M2 | HEART RATE: 69 BPM | SYSTOLIC BLOOD PRESSURE: 127 MMHG | HEIGHT: 70 IN | DIASTOLIC BLOOD PRESSURE: 81 MMHG | WEIGHT: 172 LBS

## 2023-05-30 DIAGNOSIS — H61.23 IMPACTED CERUMEN, BILATERAL: ICD-10-CM

## 2023-05-30 DIAGNOSIS — H90.3 SENSORINEURAL HEARING LOSS, BILATERAL: ICD-10-CM

## 2023-05-30 PROCEDURE — 69210 REMOVE IMPACTED EAR WAX UNI: CPT

## 2023-05-30 PROCEDURE — 99203 OFFICE O/P NEW LOW 30 MIN: CPT | Mod: 25

## 2023-05-30 RX ORDER — FINASTERIDE 5 MG/1
5 TABLET, FILM COATED ORAL
Refills: 0 | Status: ACTIVE | COMMUNITY

## 2023-05-30 RX ORDER — TAMSULOSIN HYDROCHLORIDE 0.4 MG/1
CAPSULE ORAL
Refills: 0 | Status: ACTIVE | COMMUNITY

## 2023-05-30 RX ORDER — PRIMIDONE 50 MG/1
TABLET ORAL
Refills: 0 | Status: ACTIVE | COMMUNITY

## 2023-05-30 RX ORDER — ATORVASTATIN CALCIUM 10 MG/1
10 TABLET, FILM COATED ORAL
Refills: 0 | Status: ACTIVE | COMMUNITY

## 2023-05-30 NOTE — PHYSICAL EXAM
[de-identified] : CI AU [Normal] : mucosa is normal [Midline] : trachea located in midline position
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
## 2023-08-02 NOTE — ED STATDOCS - NS ED MD DISPO DISCHARGE
[facility] MEDICINE PROGRESS NOTE   Patient: Zuri Hogan  Today's Date: 2023    YOB: 1940  Admission Date: 2023    MRN: [id number]  Inpatient LOS: 1    Room: [facility]  Hospital Day: Hospital Day: 2    Subjective   HISTORY AND SUBJECTIVE COMPLAINTS     Chief Complaint:   Generalized weakness and leg wound      Subjective:  Awake, has nausea, and phlegm   Denies pain   + rash to groin area           Pertinent systems negative except as above.    Objective   PHYSICAL EXAMINATION     Vital 24 Hour Range Most Recent Value   Temperature Temp  Min: 98.9 °F (37.2 °C)  Max: 101.9 °F (38.8 °C) 98.9 °F (37.2 °C)   Pulse Pulse  Min: 91  Max: 115 (!) 115   Respiratory Resp  Min: 16  Max: 33 (!) 32   Blood Pressure BP  Min: 98/56  Max: 162/80 121/79   Pulse Oximetry SpO2  Min: 93 %  Max: 100 % 96 %   Arterial BP No data recorded     O2 O2 Flow Rate (L/min)  Av L/min  Min: 0 L/min   Min taken time: 23  Max: 0 L/min   Max taken time: 23       Recorded Intake and Output:    Intake/Output Summary (Last 24 hours) at 2023 0953  Last data filed at 2023 1747  Gross per 24 hour   Intake --   Output 25 ml   Net -25 ml      Recorded Last Stool Occurrence:       Vital Most Recent Value First Value   Weight 78.9 kg (173 lb 15.1 oz) Weight: 78.9 kg (173 lb 15.1 oz)   Height 5' 5\" (165.1 cm) Height: 5' 5\" (165.1 cm)   BMI   N/A     Skin right leg wounds are noticed with redness  Neck supple  Lung scattered crackles with adequate air flow  CVS S 1 and S 2 systolic murmur.  Abdomen bowel sound is present  CNS cranial nerves are intact   Motor and sensory are impaired.      TEST RESULTS     Labs: The Laboratory values listed below have been reviewed and pertinent findings discussed in the Assessment and Plan.    Laboratory values:   Recent Labs   Lab 23  0358 23  1716   WBC 12.9* 15.4*   HGB 10.5* 11.6*   HCT 31.2* 35.5*   * 188       Recent  Labs   Lab 08/02/23  0358 08/01/23  1724 08/01/23  1716   SODIUM 137  --  135   POTASSIUM 3.9  --  4.4   CHLORIDE 106  --  100   CO2 24  --  25   CALCIUM 8.0*  --  8.5   GLUCOSE 158*  --  139*   BUN 26*  --  26*   CREATININE 0.94 1.30* 1.10*        Recent Labs   Lab 08/02/23  0358 08/01/23  1716   ALBUMIN 2.5* 3.3*   AST 18 35   GPT 12 21   BILIRUBIN 1.0 1.3*       Radiology: Imaging studies have been reviewed and pertinent findings discussed in the Assessment and Plan.  Results for orders placed or performed during the hospital encounter of 08/01/23 (from the past 48 hour(s))   XR CHEST PA OR AP 1 VIEW    Impression    IMPRESSION:  1.   No acute cardiopulmonary process.            Electronically Signed by: Glen Catherine DO   Signed on: 8/1/2023 6:11 PM   Workstation ID: [id number]   XR TIBIA FIBULA 2 VIEWS RIGHT    Impression    IMPRESSION:  There is soft tissue swelling in the right anterior leg without evidence of osteomyelitis. Can consider MRI.    I, Attending Radiologist Glen Catherine DO, have reviewed the images and report and concur with these findings interpreted by Resident Radiologist, Reji Wills MD.    Electronically Signed by: Glen Catherine DO   Signed on: 8/1/2023 7:11 PM   Workstation ID: [id number]        ANCILLARY ORDERS     Diet:  Regular Diet  One Time Diet Regular; Please Deliver to Er Room P3. Thank You.  Telemetry: On  Consults:    PHARMACY TO DOSE AND MONITOR VANCOMYCIN  IP CONSULT TO WOUND CARE MEDICAL PROVIDER  IP CONSULT TO NUTRITION SERVICES  Therapy Orders:   PT and OT Orders Placed this Encounter   Procedures   • Occupational Therapy   • Physical Therapy       ADVANCED DIRECTIVES     Code Status: Full Resuscitation         ASSESSMENT AND PLAN     A41.9 Sepsis, unspecified organism    L03.115 Cellulitis of right lower limb  I47.1 SVT   N18.3 Chronic kidney disease, stage 3 (moderate)  Mitral stenosis   R54 Age-related physical debility    Plan      Gram + bacteremia, on  IV vanco  Possible LE wounds  Consult wound care and ID     Continue with Rocephin for now    Continue BB     DC IV fluids    Add zofran mucinex      Follow CBC BMP closely.          History of SVT and AFib on Eliquis which is being continued       History of mild CAD continue aspirin.          Debility and deconditioning continue PT OT.    Imaging and labs reviewed in detail.                 Smoking status: non smoker    Nutrition status: appropriate  Body mass index is 28.95 kg/m². - appropriate weight BMI 18.5-24  DVT Prophylaxis: Eliquis         DISCHARGE PLANNING     The patient's treatment plans were discussed with patient.     Recommendations for Discharge   SW     PT     OT     SLP        Anticipated discharge destination: Home  Expected Discharge Date: 08/04/2023  Barriers to Discharge: Patient is not medically ready and needs to remain in the hospital today due to sepsis .          Julian Alonzo MD    8/2/2023  9:53 AM         08/02/23    Visit Vitals  BP (!) 154/84 (BP Location: LUE - Left upper extremity, Patient Position: Semi-Perez's)   Pulse 83   Temp 98.6 °F (37 °C) (Oral)   Resp 20   Ht 5' 5\" (1.651 m)   Wt 76.6 kg (168 lb 14 oz)   SpO2 97%   BMI 28.10 kg/m²         Physician addendum :    Chart reviewed.  Case discussed in detail with Tao CISSE   Portions of the history and physical exam were independently performed by me.  The entire medical decision making was done by me.  The Nurse practitioner  has collaborated with me and scribed my plan of care in her note.  Treatment plan discussed with the patient.     Lungs clear, heart S1S2, abdomen soft.    Labs, imaging, cultures reviewed      Julian Alonzo MD       Home

## 2023-09-07 ENCOUNTER — APPOINTMENT (OUTPATIENT)
Dept: CARDIOLOGY | Facility: CLINIC | Age: 83
End: 2023-09-07
Payer: MEDICARE

## 2023-09-07 VITALS
HEART RATE: 60 BPM | HEIGHT: 70 IN | DIASTOLIC BLOOD PRESSURE: 66 MMHG | BODY MASS INDEX: 25.62 KG/M2 | WEIGHT: 179 LBS | OXYGEN SATURATION: 100 % | SYSTOLIC BLOOD PRESSURE: 119 MMHG

## 2023-09-07 DIAGNOSIS — N40.0 BENIGN PROSTATIC HYPERPLASIA WITHOUT LOWER URINARY TRACT SYMPMS: ICD-10-CM

## 2023-09-07 DIAGNOSIS — E78.5 HYPERLIPIDEMIA, UNSPECIFIED: ICD-10-CM

## 2023-09-07 DIAGNOSIS — Z87.891 PERSONAL HISTORY OF NICOTINE DEPENDENCE: ICD-10-CM

## 2023-09-07 DIAGNOSIS — Z82.69 FAMILY HISTORY OF OTHER DISEASES OF THE MUSCULOSKELETAL SYSTEM AND CONNECTIVE TISSUE: ICD-10-CM

## 2023-09-07 DIAGNOSIS — R60.9 EDEMA, UNSPECIFIED: ICD-10-CM

## 2023-09-07 PROCEDURE — 93000 ELECTROCARDIOGRAM COMPLETE: CPT

## 2023-09-07 PROCEDURE — 99204 OFFICE O/P NEW MOD 45 MIN: CPT

## 2023-09-07 RX ORDER — MELOXICAM 15 MG/1
15 TABLET ORAL DAILY
Qty: 30 | Refills: 0 | Status: DISCONTINUED | COMMUNITY
Start: 2022-12-23 | End: 2023-09-07

## 2023-09-07 NOTE — DISCUSSION/SUMMARY
[FreeTextEntry1] : This is an 82 year old man with a history of BPH and hyperlipidemia who presents to the office for a cardiac evaluation.  He reports that he has noticed right LE edema.  It began after he found an insect bite.  He is also getting numbness when he crosses his legs.  He needs an echocardiogram to assess for any structural heart disease, especially given his murmur.  He needs a venous Doppler to rule to DVT, and an arterial US to rule out PVD.  His swelling is improving, and may be related to the insect bite.  We discussed the benefits of a healthy diet, regular exercise and physical activity, and weight loss in detail.  HR and BP are controlled.  I will call with the results, and to schedule any necessary follow up.  [EKG obtained to assist in diagnosis and management of assessed problem(s)] : EKG obtained to assist in diagnosis and management of assessed problem(s)

## 2023-09-07 NOTE — PHYSICAL EXAM
[Well Developed] : well developed [Well Nourished] : well nourished [No Acute Distress] : no acute distress [Normal Conjunctiva] : normal conjunctiva [Normal Venous Pressure] : normal venous pressure [No Carotid Bruit] : no carotid bruit [Normal S1, S2] : normal S1, S2 [No Rub] : no rub [No Gallop] : no gallop [Clear Lung Fields] : clear lung fields [Good Air Entry] : good air entry [No Respiratory Distress] : no respiratory distress  [Soft] : abdomen soft [Non Tender] : non-tender [No Masses/organomegaly] : no masses/organomegaly [Normal Bowel Sounds] : normal bowel sounds [Normal Gait] : normal gait [No Cyanosis] : no cyanosis [No Clubbing] : no clubbing [No Varicosities] : no varicosities [No Rash] : no rash [No Skin Lesions] : no skin lesions [Moves all extremities] : moves all extremities [No Focal Deficits] : no focal deficits [Normal Speech] : normal speech [Alert and Oriented] : alert and oriented [Normal memory] : normal memory [de-identified] : 2/6 systolic murmur [de-identified] : 1+ right ankle edema.

## 2023-09-11 ENCOUNTER — APPOINTMENT (OUTPATIENT)
Dept: CARDIOLOGY | Facility: CLINIC | Age: 83
End: 2023-09-11
Payer: MEDICARE

## 2023-09-11 PROCEDURE — 93970 EXTREMITY STUDY: CPT

## 2023-09-11 PROCEDURE — 93925 LOWER EXTREMITY STUDY: CPT

## 2023-09-11 PROCEDURE — 93306 TTE W/DOPPLER COMPLETE: CPT

## 2023-12-26 NOTE — ED ADULT NURSE NOTE - FALL HARM RISK TYPE OF ASSESSMENT
Goal Outcome Evaluation:  Plan of Care Reviewed With: patient        Progress: improving  Outcome Evaluation: Informed patient would have no assistance at home if dc'd to home.  Patient concerned about returning home alone.  Patient was unable to get OOB without asking and needing min assistance to transition sup to sit; Patient demonstrates significant decreased balance in high level balance activities which patient would need to use to perform indep at home.  Agree that snf stay to continue therapy prior to returning home indep would benefit patient and increase safety and decrease risk of fall or future injury.  PT appropriate to continue work on balance activities as well as indep household activities.      Anticipated Discharge Disposition (PT): skilled nursing facility   Admission

## 2024-04-09 NOTE — ED ADULT TRIAGE NOTE - NS ED NURSE DIRECT TO ROOM YN
From: Lexi Rae  To: Amarjit Pederson  Sent: 4/9/2024 8:55 AM CDT  Subject: Lab work     Good morning. I had an appointment with Dr Kraft yesterday, and he would like added to my lab work on May 29th, CBC with Diff , CMP, and Vitamin D level. Please send results to him as well.   Thank you,   Lexi Rae   
Yes

## 2024-04-15 NOTE — ED ADULT TRIAGE NOTE - PRO INTERPRETER NEED 2
BP has trended up since stopping beta blocker and HR up even more significantly, con't current Amlodipine, restart Metoprolol succ 25 mg 1/2 tab q day, send in BP readings in 2 wks, call with SE, re-eval in 3 mos  
Improving, unsure whether d/t nicer weather or increase in Amlodipine - likely multifactorial, con't current CCB and encouraged to check digits frequently - call with ulcers  
Restart OTC Ca-Vit D 600-200 1 tab bid, check Vit D with labs - BW order given, will follow  
English

## 2024-07-19 ENCOUNTER — OFFICE (OUTPATIENT)
Dept: URBAN - METROPOLITAN AREA CLINIC 100 | Facility: CLINIC | Age: 84
Setting detail: OPHTHALMOLOGY
End: 2024-07-19
Payer: MEDICARE

## 2024-07-19 DIAGNOSIS — H16.123: ICD-10-CM

## 2024-07-19 PROCEDURE — 92012 INTRM OPH EXAM EST PATIENT: CPT | Performed by: OPHTHALMOLOGY

## 2024-07-19 ASSESSMENT — CONFRONTATIONAL VISUAL FIELD TEST (CVF)
OD_FINDINGS: FULL
OS_FINDINGS: FULL

## 2024-08-07 ENCOUNTER — OFFICE (OUTPATIENT)
Dept: URBAN - METROPOLITAN AREA CLINIC 6 | Facility: CLINIC | Age: 84
Setting detail: OPHTHALMOLOGY
End: 2024-08-07
Payer: MEDICARE

## 2024-08-07 DIAGNOSIS — H16.123: ICD-10-CM

## 2024-08-07 DIAGNOSIS — H40.013: ICD-10-CM

## 2024-08-07 PROCEDURE — 92133 CPTRZD OPH DX IMG PST SGM ON: CPT | Performed by: OPHTHALMOLOGY

## 2024-08-07 PROCEDURE — 99213 OFFICE O/P EST LOW 20 MIN: CPT | Performed by: OPHTHALMOLOGY

## 2024-08-07 ASSESSMENT — CONFRONTATIONAL VISUAL FIELD TEST (CVF)
OS_FINDINGS: FULL
OD_FINDINGS: FULL
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
## 2025-08-28 ENCOUNTER — APPOINTMENT (OUTPATIENT)
Dept: UROLOGY | Facility: CLINIC | Age: 85
End: 2025-08-28